# Patient Record
Sex: FEMALE | Race: BLACK OR AFRICAN AMERICAN | NOT HISPANIC OR LATINO | URBAN - METROPOLITAN AREA
[De-identification: names, ages, dates, MRNs, and addresses within clinical notes are randomized per-mention and may not be internally consistent; named-entity substitution may affect disease eponyms.]

---

## 2023-11-25 ENCOUNTER — EMERGENCY (EMERGENCY)
Facility: HOSPITAL | Age: 33
LOS: 1 days | Discharge: HOME | End: 2023-11-25
Attending: EMERGENCY MEDICINE | Admitting: EMERGENCY MEDICINE
Payer: MEDICAID

## 2023-11-25 VITALS
RESPIRATION RATE: 16 BRPM | DIASTOLIC BLOOD PRESSURE: 121 MMHG | OXYGEN SATURATION: 100 % | TEMPERATURE: 98 F | HEART RATE: 81 BPM | SYSTOLIC BLOOD PRESSURE: 173 MMHG

## 2023-11-25 VITALS
HEIGHT: 66 IN | SYSTOLIC BLOOD PRESSURE: 210 MMHG | TEMPERATURE: 98 F | WEIGHT: 293 LBS | RESPIRATION RATE: 16 BRPM | HEART RATE: 83 BPM | OXYGEN SATURATION: 100 % | DIASTOLIC BLOOD PRESSURE: 151 MMHG

## 2023-11-25 PROCEDURE — 73130 X-RAY EXAM OF HAND: CPT | Mod: 26,LT

## 2023-11-25 PROCEDURE — 73130 X-RAY EXAM OF HAND: CPT

## 2023-11-25 PROCEDURE — G1004: CPT

## 2023-11-25 PROCEDURE — 70486 CT MAXILLOFACIAL W/O DYE: CPT | Mod: MG

## 2023-11-25 PROCEDURE — 72125 CT NECK SPINE W/O DYE: CPT | Mod: 26,MG

## 2023-11-25 PROCEDURE — 70450 CT HEAD/BRAIN W/O DYE: CPT | Mod: 26,MG

## 2023-11-25 PROCEDURE — 99285 EMERGENCY DEPT VISIT HI MDM: CPT

## 2023-11-25 PROCEDURE — 73562 X-RAY EXAM OF KNEE 3: CPT | Mod: 26,LT

## 2023-11-25 PROCEDURE — 90715 TDAP VACCINE 7 YRS/> IM: CPT

## 2023-11-25 PROCEDURE — 73562 X-RAY EXAM OF KNEE 3: CPT

## 2023-11-25 PROCEDURE — 90471 IMMUNIZATION ADMIN: CPT

## 2023-11-25 PROCEDURE — 99284 EMERGENCY DEPT VISIT MOD MDM: CPT | Mod: 25

## 2023-11-25 PROCEDURE — 70486 CT MAXILLOFACIAL W/O DYE: CPT | Mod: 26,MG

## 2023-11-25 PROCEDURE — 70450 CT HEAD/BRAIN W/O DYE: CPT | Mod: MG

## 2023-11-25 PROCEDURE — 99053 MED SERV 10PM-8AM 24 HR FAC: CPT

## 2023-11-25 PROCEDURE — 72125 CT NECK SPINE W/O DYE: CPT | Mod: MG

## 2023-11-25 RX ORDER — ACETAMINOPHEN 500 MG
975 TABLET ORAL ONCE
Refills: 0 | Status: COMPLETED | OUTPATIENT
Start: 2023-11-25 | End: 2023-11-25

## 2023-11-25 RX ORDER — TETANUS TOXOID, REDUCED DIPHTHERIA TOXOID AND ACELLULAR PERTUSSIS VACCINE, ADSORBED 5; 2.5; 8; 8; 2.5 [IU]/.5ML; [IU]/.5ML; UG/.5ML; UG/.5ML; UG/.5ML
0.5 SUSPENSION INTRAMUSCULAR ONCE
Refills: 0 | Status: COMPLETED | OUTPATIENT
Start: 2023-11-25 | End: 2023-11-25

## 2023-11-25 RX ADMIN — Medication 1 TABLET(S): at 06:24

## 2023-11-25 RX ADMIN — Medication 975 MILLIGRAM(S): at 04:18

## 2023-11-25 RX ADMIN — TETANUS TOXOID, REDUCED DIPHTHERIA TOXOID AND ACELLULAR PERTUSSIS VACCINE, ADSORBED 0.5 MILLILITER(S): 5; 2.5; 8; 8; 2.5 SUSPENSION INTRAMUSCULAR at 06:24

## 2023-11-25 NOTE — ED PROVIDER NOTE - NSFOLLOWUPINSTRUCTIONS_ED_ALL_ED_FT
Head Injury    AMBULATORY CARE:    A head injury can include your scalp, face, skull, or brain and range from mild to severe. Effects can appear immediately after the injury or develop later. The effects may last a short time or be permanent. Healthcare providers may want to check your recovery over time. Treatment may change as you recover or develop new health problems from the head injury.    Common signs and symptoms:    An open scalp or skin wound, swelling, or bruising    Mild to moderate headache    Dizziness or loss of balance    Nausea or vomiting    Ringing in the ears or neck pain    Confusion, especially right after the injury    Change in mood, such as feeling restless or irritable    Trouble thinking, remembering, or concentrating    Drowsiness or decreased amount of energy    Trouble sleeping  Call your local emergency number (911 in the [place]), or have someone else call if:    You cannot be woken.    You have a seizure.    You stop responding to others or you faint.    You have blurry or double vision.    Your speech becomes slurred or confused.    You have arm or leg weakness, loss of feeling, or new problems with coordination.    Your pupils are larger than usual, or one pupil is a different size than the other.    You have blood or clear fluid coming out of your ears or nose.  Seek care immediately if:    You have repeated or forceful vomiting.    You feel confused.    Your headache gets worse or becomes severe.    You or someone caring for you notices that you are harder to wake than usual.  Call your doctor if:    Your symptoms last longer than 6 weeks after the injury.    You have questions or concerns about your condition or care.  Medicines:    Acetaminophen decreases pain and fever. It is available without a doctor's order. Ask how much to take and how often to take it. Follow directions. Read the labels of all other medicines you are using to see if they also contain acetaminophen, or ask your doctor or pharmacist. Acetaminophen can cause liver damage if not taken correctly.    Take your medicine as directed. Contact your healthcare provider if you think your medicine is not helping or if you have side effects. Tell your provider if you are allergic to any medicine. Keep a list of the medicines, vitamins, and herbs you take. Include the amounts, and when and why you take them. Bring the list or the pill bottles to follow-up visits. Carry your medicine list with you in case of an emergency.  Self-care:    Rest or do quiet activities. Limit your time watching TV, using the computer, or doing tasks that require a lot of thinking. Slowly return to your normal activities as directed. Do not play sports or do activities that may cause you to get hit in the head. Ask your healthcare provider when you can return to sports.    Apply ice on your head for 15 to 20 minutes every hour or as directed. Use an ice pack, or put crushed ice in a plastic bag. Cover it with a towel before you apply it to your skin. Ice helps prevent tissue damage and decreases swelling and pain.    Have someone stay with you for 24 hours , or as directed. This person can monitor you for problems and call for help if needed. When you are awake, the person should ask you a few questions every few hours to see if you are thinking clearly. An example is to ask your name or address.  Prevent another head injury:    Wear a helmet that fits properly. Do this when you play sports, or ride a bike, scooter, or skateboard. Helmets help decrease your risk for a serious head injury. Talk to your healthcare provider about other ways you can protect yourself if you play sports.    Wear your seatbelt every time you are in a car. This helps lower your risk for a head injury if you are in a car accident.  Follow up with your doctor as directed: Write down your questions so you remember to ask them during your visits.    Facial Contusion    WHAT YOU NEED TO KNOW:    A facial contusion is a bruise that appears on your face after an injury. A bruise happens when small blood vessels tear but skin does not. When blood vessels tear, blood leaks into nearby tissue, such as soft tissue or muscle. You may develop swelling and bruising around your eyes if your bruise is on your brow, forehead, or the bridge of your nose.    DISCHARGE INSTRUCTIONS:    Return to the emergency department if:    You have a fever.    You have watery, clear fluid draining from your nose.    You have changes in your vision or eye appearance.    You have changes or pain with eye movement.    You have tingling or numbness in or near the injured area.  Contact your healthcare provider if:    You find a new lump in the injured area.    Your symptoms do not improve with treatment.    You have questions or concerns about your condition or care.  Medicines:    Acetaminophen decreases pain. It is available without a doctor's order. Ask how much to take and how often to take it. Follow directions. Acetaminophen can cause liver damage if not taken correctly.    Take your medicine as directed. Contact your healthcare provider if you think your medicine is not helping or if you have side effects. Tell your provider if you are allergic to any medicine. Keep a list of the medicines, vitamins, and herbs you take. Include the amounts, and when and why you take them. Bring the list or the pill bottles to follow-up visits. Carry your medicine list with you in case of an emergency.  Ice: Apply ice on your bruise for 15 to 20 minutes every hour or as directed. Use an ice pack, or put crushed ice in a plastic bag. Cover it with a towel. Ice helps prevent tissue damage and decreases swelling and pain.    Elevation: Sleep with your head elevated to help decrease swelling.    Help your contusion heal: Do not massage the area or put heating pads or other warming devices on the bruise right after your injury. Heat and massage may slow the healing of the area.    Follow up with your healthcare provider as directed: You may need to return within a week to have your injury checked again. Write down any questions you have so you remember to ask them in your follow-up visits.    Prevent a facial contusion:    Use safety belts and child restraints.    Use safety helmets when you ride a bicycle or motorcycle.    Use a mouth and face guard during sports.    Human Bite    WHAT YOU NEED TO KNOW:    What do I need to know about a human bite? Human bites are often more serious than animal bites. The wound may be deep and cause injury to bones, muscles, and other body parts. Wounds are more likely to become infected because of the germs in a person's mouth.    What are the signs and symptoms of a human bite?    Cuts, bruises, or swelling    Bleeding or pus    Redness, tenderness, and warmth around the wound    Difficulty moving the wounded area or deformed skin    Fever  How is a human bite diagnosed? Your healthcare provider will look closely at the injury, including the area around it. Your provider will check to see if the skin is broken and how deep the wound is. Your provider will also look for other problems or signs of infection. Your provider may check your health history, including other illnesses, medicines you take, and past surgeries. Tell your provider which vaccinations you have received, such as tetanus and hepatitis B. Tell your provider when and how you were bitten. You may need any of the following:    A wound culture is a test to grow and identify any germs in your wound. This helps healthcare providers find out if you have an infection and what medicine is best to treat it.    Blood tests are used to check for an infection.    X-ray pictures may show broken bones or other injuries.  How is a human bite treated? Treatment will depend on how severe the wound is, its location, and whether other areas are affected. It may also depend on the length of time you have had the injury. You may need any of the following:    The wound will be cleaned with soap and water or antibacterial solution. This helps wash away germs and decreases the risk for infection. Objects, dirt, or dead tissues will be removed from the open wound.    Medicines may be given to prevent or treat a bacterial infection, pain, swelling, or fever. Tetanus shots, antivirals, and immune globulins may be also be given.    Stitches may be used to close the wound.    Surgery may be needed to repair a broken bone or damaged joint, tendon, or nerve. Rarely, you may need surgery to rebuild the body part with the bite wound.  How should I care for my wound?    Wash your hands. Wash before and after you care for your wound to prevent infection.  Handwashing      Clean the wound with mild soap and water. Pat the area dry. Check the wound and the area around it. Look for any swelling, redness, or fluid oozing out of it. Do this as often as ordered by your healthcare provider. Keep the area clean to prevent infection.    Cover the wound with a layer of clean gauze bandage. The bandage should be wrapped snugly, but do not wrap it tightly. You should be able to put 2 fingers under the bandage. It is too tight if you feel tingling or lose feeling in that area.    Apply pressure to stop any bleeding. Use a clean cloth to apply direct pressure to the wound.    Sit or lie so the bite area is raised above your heart, if possible. This will decrease swelling. Put pillows under an injured leg when lying in bed. A sling may be used if your arm or hand is injured.  When should I seek immediate care?    You have trouble swallowing, and your jaw and neck are stiff.    You have trouble talking, walking, or breathing.    You have increased redness, numbness, or swelling in the bitten area.    Your wound does not stop bleeding even after you apply pressure.    Your pain is the same or worse even after you take pain medicine.    Your wound or bandage has pus or a bad smell, even if you clean it every day.  When should I call my doctor?    You have a fever.    You have numbness or tingling in the area of the bite.    You have pain or problems moving the injured area or get tender lumps in the groin or armpits.    You have questions or concerns about your condition or care.  CARE AGREEMENT:    You have the right to help plan your care. Learn about your health condition and how it may be treated. Discuss treatment options with your healthcare providers to decide what care you want to receive. You always have the right to refuse treatment.    Please keep your wound clean, dry, applied splint for support and elevate your injured extremity  as recommended and follow up with a hand orthopedist next week. Take medications as prescribed and f/u in  2-3 days with hand orthopedist for re-evaluation. Elevate your injured hand and applied splint for support recommended. Seek medical attention immediately if pain, swelling became worse or if you noted discoloration to your injured hand.       Head Injury    AMBULATORY CARE:    A head injury can include your scalp, face, skull, or brain and range from mild to severe. Effects can appear immediately after the injury or develop later. The effects may last a short time or be permanent. Healthcare providers may want to check your recovery over time. Treatment may change as you recover or develop new health problems from the head injury.    Common signs and symptoms:    An open scalp or skin wound, swelling, or bruising    Mild to moderate headache    Dizziness or loss of balance    Nausea or vomiting    Ringing in the ears or neck pain    Confusion, especially right after the injury    Change in mood, such as feeling restless or irritable    Trouble thinking, remembering, or concentrating    Drowsiness or decreased amount of energy    Trouble sleeping  Call your local emergency number (911 in the [place]), or have someone else call if:    You cannot be woken.    You have a seizure.    You stop responding to others or you faint.    You have blurry or double vision.    Your speech becomes slurred or confused.    You have arm or leg weakness, loss of feeling, or new problems with coordination.    Your pupils are larger than usual, or one pupil is a different size than the other.    You have blood or clear fluid coming out of your ears or nose.  Seek care immediately if:    You have repeated or forceful vomiting.    You feel confused.    Your headache gets worse or becomes severe.    You or someone caring for you notices that you are harder to wake than usual.  Call your doctor if:    Your symptoms last longer than 6 weeks after the injury.    You have questions or concerns about your condition or care.  Medicines:    Acetaminophen decreases pain and fever. It is available without a doctor's order. Ask how much to take and how often to take it. Follow directions. Read the labels of all other medicines you are using to see if they also contain acetaminophen, or ask your doctor or pharmacist. Acetaminophen can cause liver damage if not taken correctly.    Take your medicine as directed. Contact your healthcare provider if you think your medicine is not helping or if you have side effects. Tell your provider if you are allergic to any medicine. Keep a list of the medicines, vitamins, and herbs you take. Include the amounts, and when and why you take them. Bring the list or the pill bottles to follow-up visits. Carry your medicine list with you in case of an emergency.  Self-care:    Rest or do quiet activities. Limit your time watching TV, using the computer, or doing tasks that require a lot of thinking. Slowly return to your normal activities as directed. Do not play sports or do activities that may cause you to get hit in the head. Ask your healthcare provider when you can return to sports.    Apply ice on your head for 15 to 20 minutes every hour or as directed. Use an ice pack, or put crushed ice in a plastic bag. Cover it with a towel before you apply it to your skin. Ice helps prevent tissue damage and decreases swelling and pain.    Have someone stay with you for 24 hours , or as directed. This person can monitor you for problems and call for help if needed. When you are awake, the person should ask you a few questions every few hours to see if you are thinking clearly. An example is to ask your name or address.  Prevent another head injury:    Wear a helmet that fits properly. Do this when you play sports, or ride a bike, scooter, or skateboard. Helmets help decrease your risk for a serious head injury. Talk to your healthcare provider about other ways you can protect yourself if you play sports.    Wear your seatbelt every time you are in a car. This helps lower your risk for a head injury if you are in a car accident.  Follow up with your doctor as directed: Write down your questions so you remember to ask them during your visits.    Facial Contusion    WHAT YOU NEED TO KNOW:    A facial contusion is a bruise that appears on your face after an injury. A bruise happens when small blood vessels tear but skin does not. When blood vessels tear, blood leaks into nearby tissue, such as soft tissue or muscle. You may develop swelling and bruising around your eyes if your bruise is on your brow, forehead, or the bridge of your nose.    DISCHARGE INSTRUCTIONS:    Return to the emergency department if:    You have a fever.    You have watery, clear fluid draining from your nose.    You have changes in your vision or eye appearance.    You have changes or pain with eye movement.    You have tingling or numbness in or near the injured area.  Contact your healthcare provider if:    You find a new lump in the injured area.    Your symptoms do not improve with treatment.    You have questions or concerns about your condition or care.  Medicines:    Acetaminophen decreases pain. It is available without a doctor's order. Ask how much to take and how often to take it. Follow directions. Acetaminophen can cause liver damage if not taken correctly.    Take your medicine as directed. Contact your healthcare provider if you think your medicine is not helping or if you have side effects. Tell your provider if you are allergic to any medicine. Keep a list of the medicines, vitamins, and herbs you take. Include the amounts, and when and why you take them. Bring the list or the pill bottles to follow-up visits. Carry your medicine list with you in case of an emergency.  Ice: Apply ice on your bruise for 15 to 20 minutes every hour or as directed. Use an ice pack, or put crushed ice in a plastic bag. Cover it with a towel. Ice helps prevent tissue damage and decreases swelling and pain.    Elevation: Sleep with your head elevated to help decrease swelling.    Help your contusion heal: Do not massage the area or put heating pads or other warming devices on the bruise right after your injury. Heat and massage may slow the healing of the area.    Follow up with your healthcare provider as directed: You may need to return within a week to have your injury checked again. Write down any questions you have so you remember to ask them in your follow-up visits.    Prevent a facial contusion:    Use safety belts and child restraints.    Use safety helmets when you ride a bicycle or motorcycle.    Use a mouth and face guard during sports.    Human Bite    WHAT YOU NEED TO KNOW:    What do I need to know about a human bite? Human bites are often more serious than animal bites. The wound may be deep and cause injury to bones, muscles, and other body parts. Wounds are more likely to become infected because of the germs in a person's mouth.    What are the signs and symptoms of a human bite?    Cuts, bruises, or swelling    Bleeding or pus    Redness, tenderness, and warmth around the wound    Difficulty moving the wounded area or deformed skin    Fever  How is a human bite diagnosed? Your healthcare provider will look closely at the injury, including the area around it. Your provider will check to see if the skin is broken and how deep the wound is. Your provider will also look for other problems or signs of infection. Your provider may check your health history, including other illnesses, medicines you take, and past surgeries. Tell your provider which vaccinations you have received, such as tetanus and hepatitis B. Tell your provider when and how you were bitten. You may need any of the following:    A wound culture is a test to grow and identify any germs in your wound. This helps healthcare providers find out if you have an infection and what medicine is best to treat it.    Blood tests are used to check for an infection.    X-ray pictures may show broken bones or other injuries.  How is a human bite treated? Treatment will depend on how severe the wound is, its location, and whether other areas are affected. It may also depend on the length of time you have had the injury. You may need any of the following:    The wound will be cleaned with soap and water or antibacterial solution. This helps wash away germs and decreases the risk for infection. Objects, dirt, or dead tissues will be removed from the open wound.    Medicines may be given to prevent or treat a bacterial infection, pain, swelling, or fever. Tetanus shots, antivirals, and immune globulins may be also be given.    Stitches may be used to close the wound.    Surgery may be needed to repair a broken bone or damaged joint, tendon, or nerve. Rarely, you may need surgery to rebuild the body part with the bite wound.  How should I care for my wound?    Wash your hands. Wash before and after you care for your wound to prevent infection.  Handwashing      Clean the wound with mild soap and water. Pat the area dry. Check the wound and the area around it. Look for any swelling, redness, or fluid oozing out of it. Do this as often as ordered by your healthcare provider. Keep the area clean to prevent infection.    Cover the wound with a layer of clean gauze bandage. The bandage should be wrapped snugly, but do not wrap it tightly. You should be able to put 2 fingers under the bandage. It is too tight if you feel tingling or lose feeling in that area.    Apply pressure to stop any bleeding. Use a clean cloth to apply direct pressure to the wound.    Sit or lie so the bite area is raised above your heart, if possible. This will decrease swelling. Put pillows under an injured leg when lying in bed. A sling may be used if your arm or hand is injured.  When should I seek immediate care?    You have trouble swallowing, and your jaw and neck are stiff.    You have trouble talking, walking, or breathing.    You have increased redness, numbness, or swelling in the bitten area.    Your wound does not stop bleeding even after you apply pressure.    Your pain is the same or worse even after you take pain medicine.    Your wound or bandage has pus or a bad smell, even if you clean it every day.  When should I call my doctor?    You have a fever.    You have numbness or tingling in the area of the bite.    You have pain or problems moving the injured area or get tender lumps in the groin or armpits.    You have questions or concerns about your condition or care.  CARE AGREEMENT:    You have the right to help plan your care. Learn about your health condition and how it may be treated. Discuss treatment options with your healthcare providers to decide what care you want to receive. You always have the right to refuse treatment.    Please keep your wound clean, dry, applied splint for support and elevate your injured extremity  as recommended and follow up with a hand orthopedist next week.

## 2023-11-25 NOTE — ED PROVIDER NOTE - PROVIDER TOKENS
PROVIDER:[TOKEN:[[id number]]],PROVIDER:[TOKEN:[[id number]]],PROVIDER:[TOKEN:[[id number]]]

## 2023-11-25 NOTE — ED PROVIDER NOTE - PROGRESS NOTE DETAILS
pt has no tenderness over the thoracic spine.  will d/c home with preliminary reports, f/u on final results recommended.

## 2023-11-25 NOTE — ED PROVIDER NOTE - CARE PROVIDERS DIRECT ADDRESSES
,DirectAddress_Unknown,DirectAddress_Unknown,[email or web address]

## 2023-11-25 NOTE — ED ADULT NURSE NOTE - PAIN RATING/NUMBER SCALE (0-10): ACTIVITY
Notice: We have created a more complete Cosmetic Quote plan.  The procedure name is also Cosmetic Quote.  Please review the new plan and hide the Cosmetic Quote plan you do not want to use. Restylane Price Per Syringe: 500 10 (severe pain) Discount Percentage: 0 Juvederm Price Per Syringe: 475 Botox Price Per Unit: 13 Detail Level: Simple Dysport Price Per Unit: 15

## 2023-11-25 NOTE — ED PROVIDER NOTE - PATIENT PORTAL LINK FT
You can access the FollowMyHealth Patient Portal offered by [facility] by registering at the following website: [email or web address]. By joining [employer]’s FollowMyHealth portal, you will also be able to view your health information using other applications (apps) compatible with our system.
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
## 2023-11-25 NOTE — ED PROVIDER NOTE - CARE PROVIDER_API CALL
Hakeem Cruz  Orthopaedic Surgery  [address] [place] [address], [address]  [place] [address]  Phone: [phone]  Fax: [phone]  Follow Up Time:     Wen Briceno  Orthopaedic Surgery  [address] [place] [address] [address]  [place] [address]  Phone: [phone]  Fax: [phone]  Follow Up Time:     Hari Varner  Orthopaedic Surgery  [address], [address]  [place] [address]  Phone: [phone]  Fax: [phone]  Follow Up Time:

## 2023-11-25 NOTE — ED ADULT NURSE NOTE - NS ED NURSE IV DC DT
How Severe Are Your Spot(S)?: mild
What Type Of Note Output Would You Prefer (Optional)?: Standard Output
What Is The Reason For Today's Visit?: Full Body Skin Examination
What Is The Reason For Today's Visit? (Being Monitored For X): the development of new lesions
25-Nov-2023 06:59

## 2023-11-25 NOTE — ED PROVIDER NOTE - EYES, MLM
Clear bilaterally, pupils equal, round and reactive to light. EOMI, tenderness to periorbital area b/l, left  periorbital ecchymosis
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
## 2023-11-25 NOTE — ED PROVIDER NOTE - MUSCULOSKELETAL, MLM
Spine appears normal, range of motion is not limited,  diffuse tenderness to cervical spine, no step off sign, tendern to left fingers with slight edema, no obvious deformity, superficial abrasion, vs bite to left 3rd digit, dorsal aspect below PIP , NROM to left wrist and no tenderness,
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
## 2023-11-27 DIAGNOSIS — G89.11 ACUTE PAIN DUE TO TRAUMA: ICD-10-CM

## 2023-11-27 DIAGNOSIS — M25.562 PAIN IN LEFT KNEE: ICD-10-CM

## 2023-11-27 DIAGNOSIS — R51.9 HEADACHE, UNSPECIFIED: ICD-10-CM
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
## 2023-11-27 DIAGNOSIS — S00.12XA CONTUSION OF LEFT EYELID AND PERIOCULAR AREA, INITIAL ENCOUNTER: ICD-10-CM
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
## 2023-11-27 DIAGNOSIS — M54.2 CERVICALGIA: ICD-10-CM

## 2023-11-27 DIAGNOSIS — Z23 ENCOUNTER FOR IMMUNIZATION: ICD-10-CM

## 2023-11-27 DIAGNOSIS — M25.542 PAIN IN JOINTS OF LEFT HAND: ICD-10-CM

## 2023-11-27 DIAGNOSIS — S60.413A ABRASION OF LEFT MIDDLE FINGER, INITIAL ENCOUNTER: ICD-10-CM

## 2023-11-27 DIAGNOSIS — Z91.013 ALLERGY TO SEAFOOD: ICD-10-CM

## 2024-01-22 ENCOUNTER — NON-APPOINTMENT (OUTPATIENT)
Age: 34
End: 2024-01-22

## 2024-01-22 PROBLEM — Z00.00 ENCOUNTER FOR PREVENTIVE HEALTH EXAMINATION: Status: ACTIVE | Noted: 2024-01-22

## 2024-05-07 NOTE — ED ADULT NURSE NOTE - PATIENT'S PREFERRED PRONOUN
Dr. Toney Her/She